# Patient Record
Sex: MALE | Race: WHITE
[De-identification: names, ages, dates, MRNs, and addresses within clinical notes are randomized per-mention and may not be internally consistent; named-entity substitution may affect disease eponyms.]

---

## 2021-08-25 ENCOUNTER — HOSPITAL ENCOUNTER (EMERGENCY)
Dept: HOSPITAL 75 - ER | Age: 46
Discharge: HOME | End: 2021-08-25
Payer: MEDICARE

## 2021-08-25 VITALS — BODY MASS INDEX: 23.51 KG/M2 | WEIGHT: 158.73 LBS | HEIGHT: 68.9 IN

## 2021-08-25 VITALS — DIASTOLIC BLOOD PRESSURE: 83 MMHG | SYSTOLIC BLOOD PRESSURE: 125 MMHG

## 2021-08-25 DIAGNOSIS — F17.200: ICD-10-CM

## 2021-08-25 DIAGNOSIS — R55: Primary | ICD-10-CM

## 2021-08-25 LAB
ALBUMIN SERPL-MCNC: 3.9 GM/DL (ref 3.2–4.5)
ALP SERPL-CCNC: 82 U/L (ref 40–136)
ALT SERPL-CCNC: 15 U/L (ref 0–55)
BASOPHILS # BLD AUTO: 0 10^3/UL (ref 0–0.1)
BASOPHILS NFR BLD AUTO: 1 % (ref 0–10)
BILIRUB SERPL-MCNC: 0.7 MG/DL (ref 0.1–1)
BUN/CREAT SERPL: 4
CALCIUM SERPL-MCNC: 9.1 MG/DL (ref 8.5–10.1)
CHLORIDE SERPL-SCNC: 106 MMOL/L (ref 98–107)
CO2 SERPL-SCNC: 26 MMOL/L (ref 21–32)
CREAT SERPL-MCNC: 0.89 MG/DL (ref 0.6–1.3)
EOSINOPHIL # BLD AUTO: 0.1 10^3/UL (ref 0–0.3)
EOSINOPHIL NFR BLD AUTO: 1 % (ref 0–10)
GFR SERPLBLD BASED ON 1.73 SQ M-ARVRAT: 92 ML/MIN
GLUCOSE SERPL-MCNC: 83 MG/DL (ref 70–105)
HCT VFR BLD CALC: 48 % (ref 40–54)
HGB BLD-MCNC: 16 G/DL (ref 13.3–17.7)
LYMPHOCYTES # BLD AUTO: 1.4 10^3/UL (ref 1–4)
LYMPHOCYTES NFR BLD AUTO: 20 % (ref 12–44)
MANUAL DIFFERENTIAL PERFORMED BLD QL: NO
MCH RBC QN AUTO: 30 PG (ref 25–34)
MCHC RBC AUTO-ENTMCNC: 34 G/DL (ref 32–36)
MCV RBC AUTO: 90 FL (ref 80–99)
MONOCYTES # BLD AUTO: 0.6 10^3/UL (ref 0–1)
MONOCYTES NFR BLD AUTO: 9 % (ref 0–12)
NEUTROPHILS # BLD AUTO: 4.8 10^3/UL (ref 1.8–7.8)
NEUTROPHILS NFR BLD AUTO: 69 % (ref 42–75)
PLATELET # BLD: 151 10^3/UL (ref 130–400)
PMV BLD AUTO: 10.4 FL (ref 9–12.2)
POTASSIUM SERPL-SCNC: 3.8 MMOL/L (ref 3.6–5)
PROT SERPL-MCNC: 6.3 GM/DL (ref 6.4–8.2)
SODIUM SERPL-SCNC: 140 MMOL/L (ref 135–145)
WBC # BLD AUTO: 6.9 10^3/UL (ref 4.3–11)

## 2021-08-25 PROCEDURE — 70450 CT HEAD/BRAIN W/O DYE: CPT

## 2021-08-25 PROCEDURE — 36415 COLL VENOUS BLD VENIPUNCTURE: CPT

## 2021-08-25 PROCEDURE — 80053 COMPREHEN METABOLIC PANEL: CPT

## 2021-08-25 PROCEDURE — 85025 COMPLETE CBC W/AUTO DIFF WBC: CPT

## 2021-08-25 PROCEDURE — 93005 ELECTROCARDIOGRAM TRACING: CPT

## 2021-08-25 PROCEDURE — 71045 X-RAY EXAM CHEST 1 VIEW: CPT

## 2021-08-25 NOTE — ED SYNCOPE
General


Chief Complaint:  Dizziness/Syncope


Stated Complaint:  SYNCOPE


Nursing Triage Note:  


PT WAS AT THE [employer] BU[facility] AND PASSED OUT.  PT DOES NOT REMEMBER PASSING OUT.  


PT IS  A RESIDENT AT [facility] AND HAS A .


Source of Information:  Patient


Exam Limitations:  No Limitations





History of Present Illness


Date Seen by Provider:  Aug 25, 2021


Time Seen by Provider:  10:59


Initial Comments


To ER with c/o syncope. Pt reportedly walks all over [place] and was near the 

[employer] store when he had a witnessed syncopal episode. He does not recall it. Ems 

started IV, initiated IV fluids, and transported here.  At the time of my exam 

patient states that he was just walking feeling fine when an ambulance showed up

to pick him up.  He begins laughing and states that he is not sure why he is 

here.  He states he feels perfectly fine and has never been sick.  He does not 

recall passing out.


Timing/Prior Episodes:  No Prior History


Precipitating Factors:  None


Current Symptoms:  Back to Normal





Allergies and Home Medications


Allergies


Coded Allergies:  


     No Known Drug Allergies (Unverified , 14)





Home Medications


Loratadine 10 Mg Tablet, 10 MG PO DAILY, (Reported)





Patient Home Medication List


Home Medication List Reviewed:  Yes





Review of Systems


Constitutional:  see HPI


EENTM:  see HPI


Respiratory:  no symptoms reported


Cardiovascular:  no symptoms reported


Genitourinary:  no symptoms reported


Musculoskeletal:  no symptoms reported


Skin:  no symptoms reported


Psychiatric/Neurological:  No Symptoms Reported





Past Medical-Social-Family Hx


Patient Social History


Smoking Status:  Current Everyday Smoker


Use of E-Cig and/or Vaping Edwin:  Never a User


Substance use?:  No





Seasonal Allergies


Seasonal Allergies:  Yes





Past Medical History


Reproductive Disorders:  No





Physical Exam


Vital Signs





Vital Signs - First Documented








 21





 09:54


 


Temp 37.2


 


Pulse 73


 


Resp 16


 


B/P (MAP) 136/89 (105)


 


Pulse Ox 100


 


O2 Delivery Room Air





Capillary Refill : Less Than 3 Seconds


Height, Weight, BMI


Height: 5'9"


Weight: 130lbs. oz. 58.030081om; 23.00 BMI


Method:Stated


General Appearance:  No Apparent Distress, WD/WN, Thin, Other (Alert and 

oriented, very pleasant, laughing and joking with me.  Hemodynamically stable.)


HEENT:  PERRL/EOMI, TMs Normal


Neck:  Full Range of Motion, Normal Inspection


Respiratory:  Normal Breath Sounds, No Accessory Muscle Use, No Respiratory 

Distress


Gastrointestinal:  Normal Bowel Sounds, Non Tender, Soft


Extremities:  Normal Capillary Refill, Normal Inspection


Neurologic/Psychiatric:  Alert, Oriented x3


Cranial Nerves:  Normal Hearing, Normal Speech, PERRL


Coordination/Gait:  Normal Gait


Motor/Sensory:  No Motor Deficit


Skin:  Normal Color, Warm/Dry





Progress/Results/Core Measures


Results/Orders


Lab Results





Laboratory Tests








Test


 21


11:01 Range/Units


 


 


White Blood Count


 6.9 


 4.3-11.0


10^3/uL


 


Red Blood Count


 5.29 


 4.30-5.52


10^6/uL


 


Hemoglobin 16.0  13.3-17.7  g/dL


 


Hematocrit 48  40-54  %


 


Mean Corpuscular Volume 90  80-99  fL


 


Mean Corpuscular Hemoglobin 30  25-34  pg


 


Mean Corpuscular Hemoglobin


Concent 34 


 32-36  g/dL





 


Red Cell Distribution Width 13.4  10.0-14.5  %


 


Platelet Count


 151 


 130-400


10^3/uL


 


Mean Platelet Volume 10.4  9.0-12.2  fL


 


Immature Granulocyte % (Auto) 0   %


 


Neutrophils (%) (Auto) 69  42-75  %


 


Lymphocytes (%) (Auto) 20  12-44  %


 


Monocytes (%) (Auto) 9  0-12  %


 


Eosinophils (%) (Auto) 1  0-10  %


 


Basophils (%) (Auto) 1  0-10  %


 


Neutrophils # (Auto)


 4.8 


 1.8-7.8


10^3/uL


 


Lymphocytes # (Auto)


 1.4 


 1.0-4.0


10^3/uL


 


Monocytes # (Auto)


 0.6 


 0.0-1.0


10^3/uL


 


Eosinophils # (Auto)


 0.1 


 0.0-0.3


10^3/uL


 


Basophils # (Auto)


 0.0 


 0.0-0.1


10^3/uL


 


Immature Granulocyte # (Auto)


 0.0 


 0.0-0.1


10^3/uL


 


Sodium Level 140  135-145  MMOL/L


 


Potassium Level 3.8  3.6-5.0  MMOL/L


 


Chloride Level 106    MMOL/L


 


Carbon Dioxide Level 26  21-32  MMOL/L


 


Anion Gap 8  5-14  MMOL/L


 


Blood Urea Nitrogen 4 L 7-18  MG/DL


 


Creatinine


 0.89 


 0.60-1.30


MG/DL


 


Estimat Glomerular Filtration


Rate 92 


  





 


BUN/Creatinine Ratio 4   


 


Glucose Level 83    MG/DL


 


Calcium Level 9.1  8.5-10.1  MG/DL


 


Corrected Calcium 9.2  8.5-10.1  MG/DL


 


Total Bilirubin 0.7  0.1-1.0  MG/DL


 


Aspartate Amino Transf


(AST/SGOT) 14 


 5-34  U/L





 


Alanine Aminotransferase


(ALT/SGPT) 15 


 0-55  U/L





 


Alkaline Phosphatase 82    U/L


 


Total Protein 6.3 L 6.4-8.2  GM/DL


 


Albumin 3.9  3.2-4.5  GM/DL








My Orders





Orders - MARILOU PARMAR


Cbc With Automated Diff (21 10:57)


Comprehensive Metabolic Panel (21 10:57)


Ua Culture If Indicated (21 10:57)


Ed Iv/Invasive Line Start (21 10:57)


Chest 1 View, Ap/Pa Only (21 10:57)


Ct Head Wo (21 10:57)


Ekg Tracing (21 10:57)





Vital Signs/I&O











 21





 09:54


 


Temp 37.2


 


Pulse 73


 


Resp 16


 


B/P (MAP) 136/89 (105)


 


Pulse Ox 100


 


O2 Delivery Room Air














Blood Pressure Mean:                    105











Diagnostic Imaging





   Diagonstic Imaging:  Xray, CT


   Plain Films/CT/US/NM/MRI:  chest


Comments


cxr clear. awaiting ct read.


   Reviewed:  Reviewed by Me





Departure


Communication (Admissions)


Family Conversation


EKG shows sinus rhythm rate of 72 no ST segment elevation or depression no 

ectopy


NAME:   DAYSI LASSITER


[facility] REC#:   [id number]


ACCOUNT#:   [id number]


PT STATUS:   REG ER


:   1975


PHYSICIAN:   MARILOU PARMAR


ADMIT DATE:   21/ER


                                   ***Draft***


Date of Exam:21





CT HEAD WO








PROCEDURE: CT head without contrast.





TECHNIQUE: Multiple contiguous axial images were obtained through


the brain without the use of intravenous contrast. Auto Exposure


Controls were utilized during the CT exam to meet ALARA standards


for radiation dose reduction. 





INDICATION: Syncope.





COMPARISON: Comparison is made with prior head CT from


2014.





FINDINGS: The ventricles and sulci are within normal limits. No


sulcal effacement or midline shift is identified. No acute


intra-axial or extra-axial hemorrhage is detected. Cisterns are


patent. Visualized paranasal sinuses are clear.





IMPRESSION: No acute intracranial process is detected.





  Dictated on workstation # [id number]








Dict:   21 1127


Trans:   21 1133


 [id number]





Interpreted by:     ILEANA VILLEGAS MD


Electronically signed by:





Impression





   Primary Impression:  


   Syncope


Disposition:  01 HOME, SELF-CARE


Condition:  Stable





Departure-Patient Inst.


Decision time for Depature:  11:02


Referrals:  


NO,LOCAL PHYSICIAN (PCP)


Primary Care Physician


Patient Instructions:  Syncope (Fainting) (DC)











MARILOU PARMAR APRJUMANA             Aug 25, 2021 11:02

## 2021-08-25 NOTE — DIAGNOSTIC IMAGING REPORT
PROCEDURE: CT head without contrast.



TECHNIQUE: Multiple contiguous axial images were obtained through

the brain without the use of intravenous contrast. Auto Exposure

Controls were utilized during the CT exam to meet ALARA standards

for radiation dose reduction. 



INDICATION: Syncope.



COMPARISON: Comparison is made with prior head CT from

11/30/2014.



FINDINGS: The ventricles and sulci are within normal limits. No

sulcal effacement or midline shift is identified. No acute

intra-axial or extra-axial hemorrhage is detected. Cisterns are

patent. Visualized paranasal sinuses are clear.



IMPRESSION: No acute intracranial process is detected.



Dictated by: 



  Dictated on workstation # [id number]

## 2021-08-25 NOTE — DIAGNOSTIC IMAGING REPORT
INDICATION:  

Passed out. 



COMPARISON: 

None. 



FINDINGS:

A single view of the chest demonstrates clear lungs bilaterally.

The heart is normal. There is no pneumothorax or effusion. The

osseous structures are stable. 



IMPRESSION: 

Negative chest.



Dictated by: 



  Dictated on workstation # [id number]

## 2023-06-23 ENCOUNTER — HOSPITAL ENCOUNTER (EMERGENCY)
Dept: HOSPITAL 75 - ER | Age: 48
Discharge: HOME | End: 2023-06-23
Payer: MEDICARE

## 2023-06-23 VITALS — HEIGHT: 68.9 IN | WEIGHT: 130.07 LBS | BODY MASS INDEX: 19.27 KG/M2

## 2023-06-23 VITALS — SYSTOLIC BLOOD PRESSURE: 102 MMHG | DIASTOLIC BLOOD PRESSURE: 66 MMHG

## 2023-06-23 DIAGNOSIS — F17.210: ICD-10-CM

## 2023-06-23 DIAGNOSIS — R25.8: Primary | ICD-10-CM

## 2023-06-23 DIAGNOSIS — R40.4: ICD-10-CM

## 2023-06-23 LAB
ALBUMIN SERPL-MCNC: 4.2 GM/DL (ref 3.2–4.5)
ALP SERPL-CCNC: 90 U/L (ref 40–136)
ALT SERPL-CCNC: 14 U/L (ref 0–55)
APTT PPP: YELLOW S
BACTERIA #/AREA URNS HPF: (no result) /HPF
BARBITURATES UR QL: NEGATIVE
BASOPHILS # BLD AUTO: 0.1 10^3/UL (ref 0–0.1)
BASOPHILS NFR BLD AUTO: 1 % (ref 0–10)
BENZODIAZ UR QL SCN: NEGATIVE
BILIRUB SERPL-MCNC: 0.4 MG/DL (ref 0.1–1)
BILIRUB UR QL STRIP: NEGATIVE
BUN/CREAT SERPL: 9
CALCIUM SERPL-MCNC: 9.1 MG/DL (ref 8.5–10.1)
CHLORIDE SERPL-SCNC: 105 MMOL/L (ref 98–107)
CO2 SERPL-SCNC: 25 MMOL/L (ref 21–32)
COCAINE UR QL: NEGATIVE
CREAT SERPL-MCNC: 0.85 MG/DL (ref 0.6–1.3)
EOSINOPHIL # BLD AUTO: 0.1 10^3/UL (ref 0–0.3)
EOSINOPHIL NFR BLD AUTO: 1 % (ref 0–10)
FIBRINOGEN PPP-MCNC: (no result) MG/DL
GFR SERPLBLD BASED ON 1.73 SQ M-ARVRAT: 107 ML/MIN
GLUCOSE SERPL-MCNC: 95 MG/DL (ref 70–105)
GLUCOSE UR STRIP-MCNC: NEGATIVE MG/DL
HCT VFR BLD CALC: 48 % (ref 40–54)
HGB BLD-MCNC: 16.4 G/DL (ref 13.3–17.7)
KETONES UR QL STRIP: NEGATIVE
LEUKOCYTE ESTERASE UR QL STRIP: (no result)
LIPASE SERPL-CCNC: 18 U/L (ref 8–78)
LYMPHOCYTES # BLD AUTO: 1.6 10^3/UL (ref 1–4)
LYMPHOCYTES NFR BLD AUTO: 14 % (ref 12–44)
MANUAL DIFFERENTIAL PERFORMED BLD QL: NO
MCH RBC QN AUTO: 30 PG (ref 25–34)
MCHC RBC AUTO-ENTMCNC: 34 G/DL (ref 32–36)
MCV RBC AUTO: 87 FL (ref 80–99)
METHADONE UR QL SCN: NEGATIVE
MONOCYTES # BLD AUTO: 0.7 10^3/UL (ref 0–1)
MONOCYTES NFR BLD AUTO: 6 % (ref 0–12)
NEUTROPHILS # BLD AUTO: 8.5 10^3/UL (ref 1.8–7.8)
NEUTROPHILS NFR BLD AUTO: 77 % (ref 42–75)
NITRITE UR QL STRIP: POSITIVE
OPIATES UR QL SCN: NEGATIVE
OXYCODONE UR QL: NEGATIVE
PH UR STRIP: 6 [PH] (ref 5–9)
PLATELET # BLD: 159 10^3/UL (ref 130–400)
PMV BLD AUTO: 9.8 FL (ref 9–12.2)
POTASSIUM SERPL-SCNC: 3.8 MMOL/L (ref 3.6–5)
PROPOXYPH UR QL: NEGATIVE
PROT SERPL-MCNC: 6.8 GM/DL (ref 6.4–8.2)
PROT UR QL STRIP: NEGATIVE
RBC #/AREA URNS HPF: (no result) /HPF
SODIUM SERPL-SCNC: 140 MMOL/L (ref 135–145)
SP GR UR STRIP: 1.02 (ref 1.02–1.02)
TRICYCLICS UR QL SCN: NEGATIVE
WBC # BLD AUTO: 11 10^3/UL (ref 4.3–11)
WBC #/AREA URNS HPF: (no result) /HPF

## 2023-06-23 PROCEDURE — 85025 COMPLETE CBC W/AUTO DIFF WBC: CPT

## 2023-06-23 PROCEDURE — 36415 COLL VENOUS BLD VENIPUNCTURE: CPT

## 2023-06-23 PROCEDURE — 87077 CULTURE AEROBIC IDENTIFY: CPT

## 2023-06-23 PROCEDURE — 81000 URINALYSIS NONAUTO W/SCOPE: CPT

## 2023-06-23 PROCEDURE — 93005 ELECTROCARDIOGRAM TRACING: CPT

## 2023-06-23 PROCEDURE — 83690 ASSAY OF LIPASE: CPT

## 2023-06-23 PROCEDURE — 83605 ASSAY OF LACTIC ACID: CPT

## 2023-06-23 PROCEDURE — 87088 URINE BACTERIA CULTURE: CPT

## 2023-06-23 PROCEDURE — 70450 CT HEAD/BRAIN W/O DYE: CPT

## 2023-06-23 PROCEDURE — 80053 COMPREHEN METABOLIC PANEL: CPT

## 2023-06-23 PROCEDURE — 80306 DRUG TEST PRSMV INSTRMNT: CPT

## 2023-06-23 NOTE — DIAGNOSTIC IMAGING REPORT
PROCEDURE: CT head without contrast.



TECHNIQUE: Multiple contiguous axial images were obtained through

the brain without the use of intravenous contrast. Auto Exposure

Controls were utilized during the CT exam to meet ALARA standards

for radiation dose reduction. 



INDICATION: Seizure.



FINDINGS: There is no mass, shift of the midline or hemorrhage to

suggest an acute intracranial abnormality. The normal tentorial

blush is noted. The ventricles are not abnormally dilated and

stable in size when compared to the prior CT head exam of

08/25/2021.



The bone windows show no evidence for a fracture or for a

destructive lesion. The orbits are symmetrical and within normal

limits. There is now near complete opacification of the left

maxillary antrum. The sinuses are otherwise generally clear. 



IMPRESSION:

1. There is no evidence for an acute intracranial abnormality.

2. If clinical concern regarding an underlying abnormality

persists, then MRI would be recommended for further study.

3. There is severe left maxillary sinusitis. 



Dictated by: 



  Dictated on workstation # [id number]

## 2023-06-23 NOTE — ED NEUROLOGICAL PROBLEM
General


Chief Complaint:  Neurological Problems


Stated Complaint:  SEIZURE


Nursing Triage Note:  


PT AMB TO [facility] 10 PT STATES HAD POSSIBLE SEIZURE TODAY AT APPROX 1600. REFUSED EMS




TRANSPORT. BYSTANDER STATES PT UNRESPONSIVE, WEAK PULSE, AND HAND SHAKING. PT AT




THIS X IS ALERT AND ORIENTED AT THIS X. HAS NO SEIZURE HX


Source:  patient


Exam Limitations:  no limitations





History of Present Illness


Date Seen by Provider:  Jun 23, 2023


Time Seen by Provider:  17:44


Initial Comments


Patient is a 47-year-old male who presents to the ED with a potential seizure.  

Around 4 PM patient was sitting in a chair at [facility].  Witness 

states patient was smoking a cigarette.  Patient hand started shaking bilateral.

 Patient was unarousable.  This lasted for about 5 minutes after calling his 

name several times.  Patient had a week pulse.  Patient became alert but was 

disoriented.  EMS was contacted.  States he had a similar episode 2 weeks ago 

lasting about the same amount of time.  No known history of seizures.  Patient 

on arrival has no current complaint.  Denies headache, dizziness, visual 

changes, sore throat, chest pain, cough, shortness of breath, fever, chills, 

neck pain.  No known cardiac history.  Denies of any current medication.  No 

recent travels.  They denied of any diffuse body convulsions





Allergies and Home Medications


Allergies


Coded Allergies:  


     No Known Drug Allergies (Unverified , 11/30/14)





Patient Home Medication List


Home Medication List Reviewed:  Yes


Cephalexin (Cephalexin) 500 Mg Tablet, 500 MG PO BID


   Prescribed by: WALTER MURRAY on 6/23/23 1938


Loratadine (Loratadine) 10 Mg Tablet, 10 MG PO DAILY, (Reported)


   Entered as Reported by: ARMAND CABRERA on 11/30/14 1151





Review of Systems


Review of Systems


Constitutional:  No diaphoresis, No fever, No malaise, No weakness


Eyes:  Denies Blurred Vision, Denies Drainage, Denies Decreased Acuity


Ears, Nose, Mouth, Throat:  denies ear pain, denies ear discharge


Respiratory:  No cough, No dyspnea on exertion


Cardiovascular:  No chest pain, No edema


Gastrointestinal:  No abdominal pain, No diarrhea, No nausea, No vomiting


Genitourinary:  No decreased output, No discharge


Musculoskeletal:  No back pain, No joint pain


Skin:  No change in color, No change in hair/nails


Psychiatric/Neurological:  Other (seizure)





All Other Systems Reviewed


Negative Unless Noted:  Yes





Past Medical-Social-Family Hx


Patient Social History


Tobacco Use?:  Yes


Tobacco type used:  Cigarettes


Smoking Status:  Current Everyday Smoker


Substance use?:  No


Alcohol Use?:  No


Pt feels they are or have been:  No





Immunizations Up To Date


First/Initial COVID19 Vaccinat:  YES


Second COVID19 Vaccination Demarcus:  YES


Third COVID19 Vaccination Date:  YES





Seasonal Allergies


Seasonal Allergies:  Yes





Past Medical History


Surgery/Hospitalization HX:  


NO MED HX


Reproductive Disorders:  No





Physical Exam


Vital Signs





Vital Signs - First Documented








 6/23/23 6/23/23





 17:30 20:25


 


Temp 37.9 


 


Pulse 75 


 


Resp 18 


 


B/P (MAP) 128/89 (102) 


 


Pulse Ox  97


 


O2 Delivery  Room Air





Capillary Refill : Less Than 3 Seconds


Height, Weight, BMI


Height: 5'9"


Weight: 130lbs. oz. 58.864069jq; 19.00 BMI


Method:Stated


General Appearance:  WD/WN, no apparent distress


HEENT:  PERRL/EOMI, normal ENT inspection, TMs normal, pharynx normal


Neck:  non-tender, full range of motion, supple


Respiratory:  chest non-tender, lungs clear, normal breath sounds, no 

respiratory distress, no accessory muscle use


Cardiovascular:  regular rate, rhythm, no edema, no gallop, no JVD


Gastrointestinal:  normal bowel sounds, non tender, soft, no organomegaly


Back:  normal inspection, no CVA tenderness, no vertebral tenderness


Extremities:  normal range of motion, non-tender, normal inspection, no pedal 

edema


Neurologic/Psychiatric:  CNs II-XII nml as tested, no motor/sensory deficits, 

alert, normal mood/affect, oriented x 3


Crainal Nerves:  normal hearing, normal speech, PERRL


Coordination/Gait:  normal finger to nose, normal gait


Motor/Sensory:  no motor deficit, no sensory deficit


Skin:  normal color, warm/dry





Focused Exam


Lactate Level


6/23/23 17:50: Lactic Acid Level 1.07





Lactic Acid Level





Laboratory Tests








Test


 6/23/23


17:50


 


Lactic Acid Level


 1.07 MMOL/L


(0.50-2.00)











Progress/Results/Core Measures


Results/Orders


Lab Results





Laboratory Tests








Test


 6/23/23


17:50 6/23/23


18:54 Range/Units


 


 


White Blood Count


 11.0 


 


 4.3-11.0


10^3/uL


 


Red Blood Count


 5.50 


 


 4.30-5.52


10^6/uL


 


Hemoglobin 16.4   13.3-17.7  g/dL


 


Hematocrit 48   40-54  %


 


Mean Corpuscular Volume 87   80-99  fL


 


Mean Corpuscular Hemoglobin 30   25-34  pg


 


Mean Corpuscular Hemoglobin


Concent 34 


 


 32-36  g/dL





 


Red Cell Distribution Width 13.2   10.0-14.5  %


 


Platelet Count


 159 


 


 130-400


10^3/uL


 


Mean Platelet Volume 9.8   9.0-12.2  fL


 


Immature Granulocyte % (Auto) 0    %


 


Neutrophils (%) (Auto) 77 H  42-75  %


 


Lymphocytes (%) (Auto) 14   12-44  %


 


Monocytes (%) (Auto) 6   0-12  %


 


Eosinophils (%) (Auto) 1   0-10  %


 


Basophils (%) (Auto) 1   0-10  %


 


Neutrophils # (Auto)


 8.5 H


 


 1.8-7.8


10^3/uL


 


Lymphocytes # (Auto)


 1.6 


 


 1.0-4.0


10^3/uL


 


Monocytes # (Auto)


 0.7 


 


 0.0-1.0


10^3/uL


 


Eosinophils # (Auto)


 0.1 


 


 0.0-0.3


10^3/uL


 


Basophils # (Auto)


 0.1 


 


 0.0-0.1


10^3/uL


 


Immature Granulocyte # (Auto)


 0.0 


 


 0.0-0.1


10^3/uL


 


Sodium Level 140   135-145  MMOL/L


 


Potassium Level 3.8   3.6-5.0  MMOL/L


 


Chloride Level 105     MMOL/L


 


Carbon Dioxide Level 25   21-32  MMOL/L


 


Anion Gap 10   5-14  MMOL/L


 


Blood Urea Nitrogen 8   7-18  MG/DL


 


Creatinine


 0.85 


 


 0.60-1.30


MG/DL


 


Estimat Glomerular Filtration


Rate 107 


 


  





 


BUN/Creatinine Ratio 9    


 


Glucose Level 95     MG/DL


 


Lactic Acid Level


 1.07 


 


 0.50-2.00


MMOL/L


 


Calcium Level 9.1   8.5-10.1  MG/DL


 


Corrected Calcium 8.9   8.5-10.1  MG/DL


 


Total Bilirubin 0.4   0.1-1.0  MG/DL


 


Aspartate Amino Transf


(AST/SGOT) 16 


 


 5-34  U/L





 


Alanine Aminotransferase


(ALT/SGPT) 14 


 


 0-55  U/L





 


Alkaline Phosphatase 90     U/L


 


Total Protein 6.8   6.4-8.2  GM/DL


 


Albumin 4.2   3.2-4.5  GM/DL


 


Lipase 18   8-78  U/L


 


Urine Color  YELLOW   


 


Urine Clarity  CLOUDY   


 


Urine pH  6.0  5-9  


 


Urine Specific Gravity  1.020  1.016-1.022  


 


Urine Protein  NEGATIVE  NEGATIVE  


 


Urine Glucose (UA)  NEGATIVE  NEGATIVE  


 


Urine Ketones  NEGATIVE  NEGATIVE  


 


Urine Nitrite  POSITIVE H NEGATIVE  


 


Urine Bilirubin  NEGATIVE  NEGATIVE  


 


Urine Urobilinogen  0.2  < = 1.0  MG/DL


 


Urine Leukocyte Esterase  2+ H NEGATIVE  


 


Urine RBC (Auto)  NEGATIVE  NEGATIVE  


 


Urine RBC  NONE   /HPF


 


Urine WBC  25-50 H  /HPF


 


Urine Crystals  NONE   /LPF


 


Urine Bacteria  LARGE H  /HPF


 


Urine Casts  NONE   /LPF


 


Urine Mucus  NEGATIVE   /LPF


 


Urine Culture Indicated  YES   


 


Urine Opiates Screen  NEGATIVE  NEGATIVE  


 


Urine Oxycodone Screen  NEGATIVE  NEGATIVE  


 


Urine Methadone Screen  NEGATIVE  NEGATIVE  


 


Urine Propoxyphene Screen  NEGATIVE  NEGATIVE  


 


Urine Barbiturates Screen  NEGATIVE  NEGATIVE  


 


Ur Tricyclic Antidepressants


Screen 


 NEGATIVE 


 NEGATIVE  





 


Urine Phencyclidine Screen  NEGATIVE  NEGATIVE  


 


Urine Amphetamines Screen  NEGATIVE  NEGATIVE  


 


Urine Methamphetamines Screen  NEGATIVE  NEGATIVE  


 


Urine Benzodiazepines Screen  NEGATIVE  NEGATIVE  


 


Urine Cocaine Screen  NEGATIVE  NEGATIVE  


 


Urine Cannabinoids Screen  NEGATIVE  NEGATIVE  








My Orders





Orders - NA RICKETTS


Cbc With Automated Diff (6/23/23 17:42)


Comprehensive Metabolic Panel (6/23/23 17:42)


Lipase (6/23/23 17:42)


Lactic Acid Analyzer (6/23/23 17:42)


Ekg Tracing (6/23/23 17:42)


Ua Culture If Indicated (6/23/23 17:42)


Drug Screen Stat (Urine) (6/23/23 17:42)


Ns Iv 1000 Ml (Sodium Chloride 0.9%) (6/23/23 17:42)


Ct Head Wo (6/23/23 17:47)


Urine Culture (6/23/23 18:54)





Vital Signs/I&O











 6/23/23 6/23/23





 17:30 20:25


 


Temp 37.9 37.9


 


Pulse 75 75


 


Resp 18 18


 


B/P (MAP) 128/89 (102) 102/66


 


Pulse Ox  97


 


O2 Delivery  Room Air














Blood Pressure Mean:                    102








Comment


Sinus rhythm, 72 bpm, QRS duration 92 MS, QTc 423 MS.





Departure


Communication (PCP)


Reviewed previous ER visits, H&P, lab testing.  History of seizure-like 

activity.  He states last seizure 2 weeks ago very similar.  Did not get 

evaluated.  Similar like seizure to the day.  Seizure last for 5 minutes.  

Witness states patient hands were shaking and was unresponsive.  Noted weak 

pulse.  EMS stated that patient appeared confused on arrival.  Patient came to 

the ED by POV  On arrival he is alert and orient x4.  GCS 15.  He has no current

complaint.  Denies history of seizures.  Not currently on any type of 

medication.  No known cardiac history.  Denied of any prior chest pain before 

the seizure or current chest pain at this time.  EKG, CT scan head General lab 

work was ordered.  CT scan head was negative for bleed or or mass.  EKG showed 

sinus rhythm without evidence of arrhythmia.  CBC, CMP grossly unremarkable.  

Lactic acid was normal.  Potential seizure but would suspect elevated lactic 

acid.  Patient was observed here in the ED.  No seizure-like activity.  Patient 

urinalysis did test positive for UTI.  Will cover with Keflex.  Recommend no 

driving.  Follow-up your PCP in 2 to 3 days for reevaluation of your symptoms.  

Patient vital signs stable.  Does not appear toxic.  No evidence of any 

strokelike symptoms.  No focal neural deficits.  Return precaution were 

discussed with patient and family.  Further evaluation for syncope versus 

seizure-like activity.





Impression





   Primary Impression:  


   Seizure-like activity


Disposition:  01 HOME, SELF-CARE


Condition:  Stable





Departure-Patient Inst.


Decision time for Depature:  19:37


Referrals:  


[facility]





NO,LOCAL PHYSICIAN (PCP)


Primary Care Physician


Patient Instructions:  Seizures, Adult ED





Add. Discharge Instructions:  


Potential seizure but recommend following up with primary care physician for 

further evaluation outpatient neurology follow-up.  Take antibiotics as 

prescribed for UTI.  If any worsening symptoms return back to ED.





All discharge instructions reviewed with patient and/or family. Voiced 

understanding.


Scripts


Cephalexin (Cephalexin) 500 Mg Tablet


500 MG PO BID for 7 Days, #14 TAB


   Prov: NA RICKETTS         6/23/23











NA RICKETTS          Jun 23, 2023 17:47

## 2023-10-18 ENCOUNTER — HOSPITAL ENCOUNTER (EMERGENCY)
Dept: HOSPITAL 75 - ER | Age: 48
Discharge: HOME | End: 2023-10-18
Payer: MEDICARE

## 2023-10-18 VITALS — HEIGHT: 68.98 IN | BODY MASS INDEX: 19.23 KG/M2 | WEIGHT: 129.85 LBS

## 2023-10-18 VITALS — SYSTOLIC BLOOD PRESSURE: 110 MMHG | DIASTOLIC BLOOD PRESSURE: 75 MMHG

## 2023-10-18 DIAGNOSIS — R25.9: ICD-10-CM

## 2023-10-18 DIAGNOSIS — F17.210: ICD-10-CM

## 2023-10-18 DIAGNOSIS — S60.511A: ICD-10-CM

## 2023-10-18 DIAGNOSIS — S60.512A: ICD-10-CM

## 2023-10-18 DIAGNOSIS — R55: ICD-10-CM

## 2023-10-18 DIAGNOSIS — S01.21XA: Primary | ICD-10-CM
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
## 2023-10-18 DIAGNOSIS — Z23: ICD-10-CM

## 2023-10-18 LAB
ALBUMIN SERPL-MCNC: 4.3 GM/DL (ref 3.2–4.5)
ALP SERPL-CCNC: 90 U/L (ref 40–136)
ALT SERPL-CCNC: 14 U/L (ref 0–55)
APTT PPP: YELLOW S
BACTERIA #/AREA URNS HPF: NEGATIVE /HPF
BARBITURATES UR QL: NEGATIVE
BASOPHILS # BLD AUTO: 0.1 10^3/UL (ref 0–0.1)
BASOPHILS NFR BLD AUTO: 0 % (ref 0–10)
BILIRUB SERPL-MCNC: 1.1 MG/DL (ref 0.1–1)
BILIRUB UR QL STRIP: NEGATIVE
BUN/CREAT SERPL: 7
CALCIUM SERPL-MCNC: 9.2 MG/DL (ref 8.5–10.1)
CHLORIDE SERPL-SCNC: 103 MMOL/L (ref 98–107)
CO2 SERPL-SCNC: 24 MMOL/L (ref 21–32)
COCAINE UR QL: NEGATIVE
CREAT SERPL-MCNC: 0.95 MG/DL (ref 0.6–1.3)
EOSINOPHIL # BLD AUTO: 0.1 10^3/UL (ref 0–0.3)
EOSINOPHIL NFR BLD AUTO: 1 % (ref 0–10)
FIBRINOGEN PPP-MCNC: CLEAR MG/DL
GFR SERPLBLD BASED ON 1.73 SQ M-ARVRAT: 98 ML/MIN
GLUCOSE SERPL-MCNC: 104 MG/DL (ref 70–105)
GLUCOSE UR STRIP-MCNC: NEGATIVE MG/DL
HCT VFR BLD CALC: 46 % (ref 40–54)
HGB BLD-MCNC: 15.6 G/DL (ref 13.3–17.7)
KETONES UR QL STRIP: (no result)
LEUKOCYTE ESTERASE UR QL STRIP: NEGATIVE
LYMPHOCYTES # BLD AUTO: 1.3 10^3/UL (ref 1–4)
LYMPHOCYTES NFR BLD AUTO: 11 % (ref 12–44)
MAGNESIUM SERPL-MCNC: 2 MG/DL (ref 1.6–2.4)
MANUAL DIFFERENTIAL PERFORMED BLD QL: NO
MCH RBC QN AUTO: 30 PG (ref 25–34)
MCHC RBC AUTO-ENTMCNC: 34 G/DL (ref 32–36)
MCV RBC AUTO: 90 FL (ref 80–99)
METHADONE UR QL SCN: NEGATIVE
MONOCYTES # BLD AUTO: 0.9 10^3/UL (ref 0–1)
MONOCYTES NFR BLD AUTO: 7 % (ref 0–12)
NEUTROPHILS # BLD AUTO: 9.8 10^3/UL (ref 1.8–7.8)
NEUTROPHILS NFR BLD AUTO: 80 % (ref 42–75)
NITRITE UR QL STRIP: NEGATIVE
OPIATES UR QL SCN: NEGATIVE
OXYCODONE UR QL: NEGATIVE
PH UR STRIP: 6 [PH] (ref 5–9)
PLATELET # BLD: 162 10^3/UL (ref 130–400)
PMV BLD AUTO: 9.9 FL (ref 9–12.2)
POTASSIUM SERPL-SCNC: 3.9 MMOL/L (ref 3.6–5)
PROPOXYPH UR QL: NEGATIVE
PROT SERPL-MCNC: 6.9 GM/DL (ref 6.4–8.2)
PROT UR QL STRIP: NEGATIVE
RBC #/AREA URNS HPF: (no result) /HPF
SODIUM SERPL-SCNC: 139 MMOL/L (ref 135–145)
SP GR UR STRIP: 1.01 (ref 1.02–1.02)
TRICYCLICS UR QL SCN: NEGATIVE
WBC # BLD AUTO: 12.3 10^3/UL (ref 4.3–11)
WBC #/AREA URNS HPF: (no result) /HPF

## 2023-10-18 PROCEDURE — 85025 COMPLETE CBC W/AUTO DIFF WBC: CPT

## 2023-10-18 PROCEDURE — 93005 ELECTROCARDIOGRAM TRACING: CPT

## 2023-10-18 PROCEDURE — 93041 RHYTHM ECG TRACING: CPT

## 2023-10-18 PROCEDURE — 72125 CT NECK SPINE W/O DYE: CPT

## 2023-10-18 PROCEDURE — 36415 COLL VENOUS BLD VENIPUNCTURE: CPT

## 2023-10-18 PROCEDURE — 83735 ASSAY OF MAGNESIUM: CPT

## 2023-10-18 PROCEDURE — 70450 CT HEAD/BRAIN W/O DYE: CPT

## 2023-10-18 PROCEDURE — 80053 COMPREHEN METABOLIC PANEL: CPT

## 2023-10-18 PROCEDURE — 90471 IMMUNIZATION ADMIN: CPT

## 2023-10-18 PROCEDURE — 80320 DRUG SCREEN QUANTALCOHOLS: CPT

## 2023-10-18 PROCEDURE — 80306 DRUG TEST PRSMV INSTRMNT: CPT

## 2023-10-18 PROCEDURE — 82947 ASSAY GLUCOSE BLOOD QUANT: CPT

## 2023-10-18 PROCEDURE — 70486 CT MAXILLOFACIAL W/O DYE: CPT

## 2023-10-18 PROCEDURE — 81000 URINALYSIS NONAUTO W/SCOPE: CPT

## 2023-10-18 PROCEDURE — 90715 TDAP VACCINE 7 YRS/> IM: CPT

## 2023-10-18 NOTE — ED HEAD INJURY
General


Chief Complaint:  Trauma-Non Activation


Stated Complaint:  FALL W/LOC


Nursing Triage Note:  


PT TO RM 3 BY EMS WITH CC OF FALL WITH + LOC AT APPROX 2326. UNK TIME OF LOC. PT




IS UNABLE TO RECALL THE EVENT PRIOR TO THE FALL AND AFTER THE FALL. EMS RPORTS 


CARLOS OF 79. PT ORIENTED TO PERSON, PLACE AND SITUATION BUT NOT TIME. ABRASION 


NOTED TO PT NOSE AND CONTROLLED NOSE BLEED AT TIME OF TRIAGE.


Source:  patient


Exam Limitations:  no limitations





History of Present Illness


Date Seen by Provider:  Oct 18, 2023


Time Seen by Provider:  00:04


Initial Comments


This 47-year-old gentleman presents to the emergency room via EMS with facial 

injuries after suffering a fall.  EMS reports patient's girlfriend witnessed the

incident and was the primary historian.  They were apparently walking when he 

stopped and had a blank stare.  He then proceeded to collapse and strike his 

face.  He has swelling and abrasions or shallow lacerations on the nose as well 

as abrasions on the dorsal aspect of both hands.  He has some slow epistaxis on 

arrival.  He denies any pain.  He is alert and conversational.  Patient denies 

having any prior episodes of syncope or seizure.  However, on review of his 

chart numerous episodes have been documented previously resulting in multiple ER

visits.  Patient later did admit to these prior episodes.  He has never been 

treated for seizure although his symptoms seem to be consistent with seizure 

activity.  He has had documentation of tremoring type movements prior to or 

during the episodes.  For example during 1 episode he was noted to have 

flickering of the eyelids.  During another episode he was noted to have 

tremoring of the bilateral upper extremities.  No convulsions were specifically 

described during this episode, but his girlfriend is not here to give report.  

Patient denies any prodrome or current symptoms of lightheadedness, dizziness, 

chest pain, shortness of breath, or other associated symptoms.  Review of chart 

reveals that he had an EEG done in 2014 which was negative.  Patient smokes but 

denies any drug or alcohol use.  Patient was noted to have UTI which was treated

on prior visit.  He reports he was previously seeing Kristal Addison in [facility] 

for primary health care.  He is not presently seeing any other provider since 

Dr. Addison went to work full-time and wound care.  Patient has mild 

intellectual disability and lives in the [facility].  He has a 

 who helps manage his financial affairs by the name of Colleen Roman.





Allergies and Home Medications


Allergies


Coded Allergies:  


     No Known Drug Allergies (Unverified , 11/30/14)





Patient Home Medication List


Home Medication List Reviewed:  Yes


Levetiracetam (Keppra) 500 Mg Tablet, 500 MG PO BID


   Prescribed by: JUSTYN ARNDT on 10/18/23 0309


Loratadine (Loratadine) 10 Mg Tablet, 10 MG PO DAILY, (Reported)


   Entered as Reported by: ARMAND CABRERA on 11/30/14 1151


Sulfamethoxazole/Trimethoprim (Bactrim Ds Tablet) 1 Each Tablet, 1 EACH PO BID


   Prescribed by: JOLYNN ROLAND on 7/3/23 1013





Review of Systems


Review of Systems


Constitutional:  no symptoms reported


Eyes:  No Symptoms Reported


Ears, Nose, Mouth, Throat:  see HPI


Respiratory:  no symptoms reported


Cardiovascular:  no symptoms reported


Gastrointestinal:  no symptoms reported


Genitourinary:  see HPI


Musculoskeletal:  no symptoms reported


Skin:  see HPI


Psychiatric/Neurological:  See HPI


Endocrine:  No Symptoms Reported


Hematologic/Lymphatic:  No Symptoms Reported





Past Medical-Social-Family Hx


Patient Social History


Tobacco Use?:  Yes


Tobacco type used:  Cigarettes


Smoking Status:  Current Everyday Smoker


Substance use?:  No


Alcohol Use?:  No





Immunizations Up To Date


First/Initial COVID19 Vaccinat:  YES


Second COVID19 Vaccination [place]arc[place]:  YES


Third COVID19 Vaccination Date:  YES





Seasonal Allergies


Seasonal Allergies:  Yes





Past Medical History


Surgery/Hospitalization HX:  


NO MED HX


Surgeries:  No


Respiratory:  No


Cardiac:  Yes (Possible history of syncopal episode)


Neurological:  Yes (Mild intellectual disability)


Seizure Disorder (Possible history of seizure episodes)


Reproductive Disorders:  No


Genitourinary:  No


Gastrointestinal:  No


Musculoskeletal:  No


Endocrine:  No


HEENT:  No


Cancer:  No


Psychosocial:  No


Integumentary:  No





Physical Exam


Vital Signs





Vital Signs - First Documented








 10/18/23





 00:03


 


Temp 36.6


 


Pulse 78


 


Resp 16


 


B/P (MAP) 132/87 (102)


 


Pulse Ox 98


 


O2 Delivery Room Air





Capillary Refill : Less Than 3 Seconds


Height, Weight, BMI


Height: 5'9"


Weight: 130lbs. oz. 58.520685vo; 19.00 BMI


Method:Stated


General Appearance:  WD/WN, no apparent distress


HEENT:  PERRL/EOMI, TMs normal, pharynx normal, other (Abrasions and shallow 

lacerations on the nose.  Nasal edema noted.  Oozing epistaxis.  No septal 

hematoma.  No significant tenderness across the face. No septal hematoma)


Neck:  non-tender, normal inspection


Cardiovascular:  regular rate, rhythm, no edema, no murmur


Respiratory:  lungs clear, normal breath sounds, no respiratory distress


Gastrointestinal:  normal bowel sounds, non tender, soft


Extremities:  no pedal edema, other (Abrasions across the dorsum of both hands 

with no significant tenderness)


Psychiatric:  alert, oriented x 3


Cra[place] Nerves:  normal hearing, normal speech, PERRL


Motor/Sensory:  no motor deficit, no sensory deficit


Skin:  normal color, warm/dry, other (Multiple abrasions)





[place] Coma Score


Best Eye Response:  (4) Open Spontaneously


Best Verbal Response:  (5) Oriented


Best Motor Response:  (6) Obeys Commands


[place] Total:  15





Progress/Results/Core Measures


Results/Orders


Lab Results





Laboratory Tests








Test


 10/18/23


00:08 10/18/23


01:48 Range/Units


 


 


White Blood Count


 12.3 H


 


 4.3-11.0


10^3/uL


 


Red Blood Count


 5.15 


 


 4.30-5.52


10^6/uL


 


Hemoglobin 15.6   13.3-17.7  g/dL


 


Hematocrit 46   40-54  %


 


Mean Corpuscular Volume 90   80-99  fL


 


Mean Corpuscular Hemoglobin 30   25-34  pg


 


Mean Corpuscular Hemoglobin


Concent 34 


 


 32-36  g/dL





 


Red Cell Distribution Width 13.1   10.0-14.5  %


 


Platelet Count


 162 


 


 130-400


10^3/uL


 


Mean Platelet Volume 9.9   9.0-12.2  fL


 


Immature Granulocyte % (Auto) 0    %


 


Neutrophils (%) (Auto) 80 H  42-75  %


 


Lymphocytes (%) (Auto) 11 L  12-44  %


 


Monocytes (%) (Auto) 7   0-12  %


 


Eosinophils (%) (Auto) 1   0-10  %


 


Basophils (%) (Auto) 0   0-10  %


 


Neutrophils # (Auto)


 9.8 H


 


 1.8-7.8


10^3/uL


 


Lymphocytes # (Auto)


 1.3 


 


 1.0-4.0


10^3/uL


 


Monocytes # (Auto)


 0.9 


 


 0.0-1.0


10^3/uL


 


Eosinophils # (Auto)


 0.1 


 


 0.0-0.3


10^3/uL


 


Basophils # (Auto)


 0.1 


 


 0.0-0.1


10^3/uL


 


Immature Granulocyte # (Auto)


 0.0 


 


 0.0-0.1


10^3/uL


 


Sodium Level 139   135-145  MMOL/L


 


Potassium Level 3.9   3.6-5.0  MMOL/L


 


Chloride Level 103     MMOL/L


 


Carbon Dioxide Level 24   21-32  MMOL/L


 


Anion Gap 12   5-14  MMOL/L


 


Blood Urea Nitrogen 7   7-18  MG/DL


 


Creatinine


 0.95 


 


 0.60-1.30


MG/DL


 


Estimat Glomerular Filtration


Rate 98 


 


  





 


BUN/Creatinine Ratio 7    


 


Glucose Level 104     MG/DL


 


Glucometer 105     MG/DL


 


Calcium Level 9.2   8.5-10.1  MG/DL


 


Corrected Calcium 9.0   8.5-10.1  MG/DL


 


Magnesium Level 2.0   1.6-2.4  MG/DL


 


Total Bilirubin 1.1 H  0.1-1.0  MG/DL


 


Aspartate Amino Transf


(AST/SGOT) 21 


 


 5-34  U/L





 


Alanine Aminotransferase


(ALT/SGPT) 14 


 


 0-55  U/L





 


Alkaline Phosphatase 90     U/L


 


Total Protein 6.9   6.4-8.2  GM/DL


 


Albumin 4.3   3.2-4.5  GM/DL


 


Serum Alcohol < 10   <10  MG/DL


 


Urine Color  YELLOW   


 


Urine Clarity  CLEAR   


 


Urine pH  6.0  5-9  


 


Urine Specific Gravity  1.015 L 1.016-1.022  


 


Urine Protein  NEGATIVE  NEGATIVE  


 


Urine Glucose (UA)  NEGATIVE  NEGATIVE  


 


Urine Ketones  1+ H NEGATIVE  


 


Urine Nitrite  NEGATIVE  NEGATIVE  


 


Urine Bilirubin  NEGATIVE  NEGATIVE  


 


Urine Urobilinogen  0.2  < = 1.0  MG/DL


 


Urine Leukocyte Esterase  NEGATIVE  NEGATIVE  


 


Urine RBC (Auto)  NEGATIVE  NEGATIVE  


 


Urine RBC  NONE   /HPF


 


Urine WBC  NONE   /HPF


 


Urine Crystals  NONE   /LPF


 


Urine Bacteria  NEGATIVE   /HPF


 


Urine Casts  NONE   /LPF


 


Urine Mucus  NEGATIVE   /LPF


 


Urine Culture Indicated  NO   


 


Urine Opiates Screen  NEGATIVE  NEGATIVE  


 


Urine Oxycodone Screen  NEGATIVE  NEGATIVE  


 


Urine Methadone Screen  NEGATIVE  NEGATIVE  


 


Urine Propoxyphene Screen  NEGATIVE  NEGATIVE  


 


Urine Barbiturates Screen  NEGATIVE  NEGATIVE  


 


Ur Tricyclic Antidepressants


Screen 


 NEGATIVE 


 NEGATIVE  





 


Urine Phencyclidine Screen  NEGATIVE  NEGATIVE  


 


Urine Amphetamines Screen  NEGATIVE  NEGATIVE  


 


Urine Methamphetamines Screen  NEGATIVE  NEGATIVE  


 


Urine Benzodiazepines Screen  NEGATIVE  NEGATIVE  


 


Urine Cocaine Screen  NEGATIVE  NEGATIVE  


 


Urine Cannabinoids Screen  NEGATIVE  NEGATIVE  








My Orders





Orders - BRUEGGEMANN,JUSTYN T MD


Ct Head/Face/Cervical Wo (10/18/23 00:08)


Ed Iv/Invasive Line Start (10/18/23 00:08)


Ekg Tracing (10/18/23 00:08)


Monitor-Rhythm Ecg Trace Only (10/18/23 00:08)


Alcohol (10/18/23 00:08)


Cbc And Automated Diff (10/18/23 00:08)


Comprehensive Metabolic Panel (10/18/23 00:08)


Drug Screen Stat (Urine) (10/18/23 00:08)


Magnesium (10/18/23 00:08)


Ua Culture If Indicated (10/18/23 00:08)


Levetiracetam Tablet (Levetiracetam Tabl (10/18/23 01:15)


Dipht/Pertuss(Acell)/Tet Adult (Dipht/Pe (10/18/23 01:15)





Medications Given in ED





Current Medications








 Medications  Dose


 Ordered  Sig/Alisson


 Route  Start Time


 Stop Time Status Last Admin


Dose Admin


 


 Diphtheria/


 Tetanus/Acell


 Pertussis  0.5 ml  ONCE ONCE


 IM  10/18/23 01:15


 10/18/23 01:16 DC 10/18/23 01:53


0.5 ML


 


 Levetiracetam  500 mg  ONCE  ONCE


 PO  10/18/23 01:15


 10/18/23 01:16 DC 10/18/23 01:52


500 MG








Vital Signs/I&O











 10/18/23 10/18/23





 00:03 03:21


 


Temp 36.6 


 


Pulse 78 


 


Resp 16 


 


B/P (MAP) 132/87 (102) 110/75


 


Pulse Ox 98 


 


O2 Delivery Room Air 














Blood Pressure Mean:                    102











Progress


Progress Note :  


Progress Note


Labs were obtained and interpreted by me. CBC was notable for WBC of 12.3 but 

was otherwise normal.  FSBS was 105.  CMP was unremarkable.  UA demonstrated 1+ 

ketones.  Toxicology screen was negative.  CT of head, face, and c-spine 

demonstrated questionable nasal fracture which did not correlate with pain or 

tenderness.  In context of clinical history and documentation of prior episodes,

the event seems likely to be seizure related.  Patient was offered Keppra which 

he accepted. Tetanus booster was administered.  See discharge instructions for 

further discussion.


Initial ECG Impression Date:  Oct 18, 2023


Initial ECG Impression Time:  00:29


Initial ECG Rate:  76


Initial ECG Rhythm:  Normal Sinus


Comment


Sinus rhythm with no ST elevation or depression.  No significant abnormal 

intervals or axis deviation.  FL interval noted to be mildly shortened at 110 

ms.





Diagnostic Imaging





   Diagonstic Imaging:  CT


   Plain Films/CT/US/NM/MRI:  facial bones, c-spine, head


Comments


CT head, face, and cervical spine reviewed by me.  No acute abnormalities were 

definitively appreciated on my interpretation other than air in the soft tissues

of the nose.  Radiologist's [employer] report was reviewed.  There was suspicion of

left maxillary fracture at the nasal base.  This was slightly angulated and not 

displaced.  True fracture is questionable as patient denies pain or tenderness 

in this area.





Departure


Impression





   Primary Impression:  


   Seizure-like activity


   Additional Impressions:  


   Syncope and collapse


   Multiple abrasions


   Facial contusion


   Qualified Codes:  S00.83XA - Contusion of other part of head, initial e

   ncounter


Disposition:  01 HOME, SELF-CARE


Condition:  Improved





Departure-Patient Inst.


Decision time for Depature:  03:07


Referrals:  


NO,LOCAL PHYSICIAN (PCP/Family)


Primary Care Physician


Patient Instructions:  Syncope (fainting), Seizures, Adult ED





Add. Discharge Instructions:  


Follow-up with your primary care offices soon as possible.  Please call later 

today to make an appointment.


Until then, continue Keppra to prevent further seizures.  Do not drive or 

operate machinery or do any other activity that could be dangerous if you had 

another episode of unresponsiveness.  Such activities might include swimming, 

using heights such as ladders, etc.


You may take Tylenol (acetaminophen) up to 1000 mg every 6 hours as needed 

and/or ibuprofen up to 600 mg every 6 hours as needed for pain.


Return to the emergency room if you have worsening symptoms despite following 

these instructions.





All discharge instructions reviewed with patient and/or family. Voiced 

understanding.


Scripts


Levetiracetam (Keppra) 500 Mg Tablet


500 MG PO BID, #60 TAB


   Prov: JUSTYN NATHAN MD         10/18/23











JUSTYN NATHAN MD        Oct 18, 2023 00:49

## 2023-10-18 NOTE — DIAGNOSTIC IMAGING REPORT
PROCEDURE: CT head, face, and cervical spine without contrast.



TECHNIQUE: Multiple contiguous axial images were obtained through

the head, neck, and facial bones without the use of intravenous

contrast. Sagittal and coronal reformations through the cervical

spine and facial bones were also performed. Auto Exposure

Controls were utilized during the CT exam to meet ALARA standards

for radiation dose reduction. 



INDICATION: Fall with head, face and cervical spine pain and

injuries.



Comparison is made with prior head CT from 06/23/2023.



CT head:



The ventricles and sulci are within normal limits. No sulcal

effacement or midline shift is identified. No acute intra-axial

or extra-axial hemorrhage is detected. Cisterns are patent. The

visualized paranasal sinuses are clear.



IMPRESSION: No acute intracranial process is identified.



CT cervical spine:



Alignment is normal. There is some degenerative disc disease at

C6-C7 level with disc space narrowing and marginal spurring. No

fractures are seen. Prevertebral tissues are within normal

limits. Odontoid is intact.



IMPRESSION: No acute bony abnormality is detected.



CT face:



The mandible is intact. Zygomatic arches are intact. Maxillary

sinus walls appear intact. There does appear to be a slightly

angulated fracture of the left frontal process at the base of the

nose with overlying nasal soft tissue swelling and soft tissue

gas consistent with laceration. Orbital walls are intact. Nasal

bones appear to be intact. Nasal septum is intact. Paranasal

sinuses appear clear. Mastoids are well aerated.



IMPRESSION: There is a fracture of the left frontal process of

the maxilla with overlying nasal soft tissue swelling and soft

tissue gas. No other facial bone fracture is detected.



Dictated by: 



  Dictated on workstation # [id number]